# Patient Record
Sex: FEMALE | Race: BLACK OR AFRICAN AMERICAN | NOT HISPANIC OR LATINO | URBAN - METROPOLITAN AREA
[De-identification: names, ages, dates, MRNs, and addresses within clinical notes are randomized per-mention and may not be internally consistent; named-entity substitution may affect disease eponyms.]

---

## 2023-05-08 ENCOUNTER — DOCUMENTATION (OUTPATIENT)
Dept: INTERNAL MEDICINE | Facility: HOSPITAL | Age: 88
End: 2023-05-08
Payer: MEDICARE

## 2023-05-10 ENCOUNTER — NURSING HOME VISIT (OUTPATIENT)
Dept: POST ACUTE CARE | Facility: EXTERNAL LOCATION | Age: 88
End: 2023-05-10
Payer: MEDICARE

## 2023-05-10 DIAGNOSIS — N30.00 ACUTE CYSTITIS WITHOUT HEMATURIA: ICD-10-CM

## 2023-05-10 DIAGNOSIS — F41.9 ANXIETY AND DEPRESSION: ICD-10-CM

## 2023-05-10 DIAGNOSIS — F32.A ANXIETY AND DEPRESSION: ICD-10-CM

## 2023-05-10 DIAGNOSIS — F03.918 DEMENTIA WITH BEHAVIORAL DISTURBANCE (MULTI): ICD-10-CM

## 2023-05-10 DIAGNOSIS — R53.81 PHYSICAL DEBILITY: ICD-10-CM

## 2023-05-10 DIAGNOSIS — N18.31 TYPE 2 DIABETES MELLITUS WITH STAGE 3A CHRONIC KIDNEY DISEASE, WITHOUT LONG-TERM CURRENT USE OF INSULIN (MULTI): ICD-10-CM

## 2023-05-10 DIAGNOSIS — F51.01 PRIMARY INSOMNIA: ICD-10-CM

## 2023-05-10 DIAGNOSIS — I26.99 PULMONARY EMBOLISM AND INFARCTION (MULTI): Primary | ICD-10-CM

## 2023-05-10 DIAGNOSIS — Z91.81 STATUS POST FALL: ICD-10-CM

## 2023-05-10 DIAGNOSIS — E11.22 TYPE 2 DIABETES MELLITUS WITH STAGE 3A CHRONIC KIDNEY DISEASE, WITHOUT LONG-TERM CURRENT USE OF INSULIN (MULTI): ICD-10-CM

## 2023-05-10 PROCEDURE — 99306 1ST NF CARE HIGH MDM 50: CPT | Performed by: FAMILY MEDICINE

## 2023-05-10 NOTE — LETTER
Patient: Renetta Christensen  : 1935    Encounter Date: 05/10/2023    Subjective  Patient ID: Renetta Christensen is a 87 y.o. female who is acute skilled care and presents for initial visit for skilled nursing.    HPI   a 87 year old Female with a past medical history of dementia with behavioral disturbance, anxiety, cataracts, T2DM (last HgA1c 6.1% 23), osteoporosis, CKD stage 3, and pelvic ring fracture. Patient was hospitalized for further evaluation after s/p mechanical fall at home. Patient's daughter impressed that patient became sluggish and fatigued once Buspar dose was increased. Worked up found to have elevated CK level, elevated CRP, impaired renal function, and hypokalemia. CT spine showed negative for any injuries. CT chest showed findings suggestive of occlusive subsegmental PE with pulmonary infarct. Pt was started on Heparin gtt and vascular medicine to switch to prophylaxis with ASA. atient was admitted for further evaluation and management.   For her mechanical fall, PT/OT recommended moderate intensity therapy   For finding of CT suggestive of PE, vascular was consulted, recommended   prophylaxis anticoagulation, no heparin drip, and was placed on Lovenox 30 mg   subcu daily.   Patient presented also with UTI, she was on ceftriaxone 2 g IV piggyback daily,   which was transitioned to Bactrim DS 1 tablet twice daily for additional 5 days   at discharge.  Urine culture showed Klebsiella pneumonia sensitive to most   antibiotics including Bactrim, but resistant to ampicillin.   Hospital stay was also notable for mild renal insufficiency, but did not meet   ROSIBEL criteria with slight uptake of creatinine from 1.09-1.20.   Geriatrics was consulted, and she had a change in regimen, with change of her   Seroquel to 25 mg 3 times daily instead of previous regimen of twice daily,   patient also was on Zyprexa 5 mg IM every 6 hours as needed agitation BuSpar 5   mg 3 times daily was added as well.   Patient has dementia at baseline, and   exhibited episode of agitation and confusion.   Patient's dementia and episode of agitation did not put her in danger to injury   to herself, aPatient discharged to memory care unit/[facility] unit at .         Review of Systems  Limited due to dementia   Objective  Physical Exam  Physical exam  Constitutional: awake, confused and disoriented, afebrile, not in acute distress  Eyes: clear sclera  ENMT: mucous membranes moist  Respiratory/Thorax: Breathing comfortable on RA  Cardiovascular: Regular rate   Musculoskeletal: Move all extremities   Extremities: no edema, no cellulitis  Neurological: A&O x1 (self)  Psychological: Irritability  Skin: warm and dry, intact      Assessment/Plan  Problem List Items Addressed This Visit       Physical debility    Status post fall    Dementia with behavioral disturbance (CMS/HCC)    Anxiety and depression    Insomnia    Urinary tract infection without hematuria    Type 2 diabetes mellitus with stage 3 chronic kidney disease, without long-term current use of insulin (CMS/HCC)    Pulmonary embolism and infarction (CMS/HCC) - Primary       #Physical deconditioning   # Mechanical fall  - No evidence of osseous injury on CT chest, abdomen, pelvis or T and L spine  - Pt has history of falls with prior pelvic ring fx per chart reviewed  - Fall precautions  - Continue PT/OT   # Dementia with behavioral disturbances  #Depression and anxiety   - Per chart reviewed; baseline Pt not oriented to place and time,frequently suffers agitation, paranoia, poor sleep and poses wander risk  - c/w memantine 10 mg oral tablet - 1 tab(s) orally 2 times a day   - continue Buspar 5 mg PO BID   - continue QUEtiapine 25 mg oral tablet - 1 tab(s) orally  - (Every 1  day at 08:30, 12:30 ) and QUEtiapine 50 mg oral tablet - 1 tab(s) orally once (at bedtime)   - c/w OLANZapine 10 mg intramuscular injection - 5 milligram(s) intramuscular every 6 hours, As needed,  agitation   - c/w sertraline 75 mg orally once a day   - Please continue first attempting nonpharmacological management of agitationbefore use of PRN medications.  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   - Recommend discontinuing SSI using lispro  - Please continue hypoglycemic order protocol  - Recommend regular diet.  # HTN  - c/w metoprolol to 50 mg daily   - Will check lab and consider to resume Aldactone 25 mg PO every day  - c/w HCTZ 25 mg PO QD  # CKD stage 3 (stable)  - Cr 1.24 and GFR 43 on admit per chart reviewed   - avoid nephrotoxic medication  - renal dose medication  - monitor weekly BMP   # Concern for PE  - c/w aspirin 81 mg oral delayed release tablet - 1 tab(s) orally daily  # Encounter for medication reviewed   #Goal of care; per patient's chart reviewed goal of care DNR/DNI. However we will need to clarify with patient's daughter     Time spending for 65 minutes   -reviewed of hospital record via EMR and reconciled medication in PCC and EMR (d/c summary). Reviewed orders, medications, records, and pertinent labs/x-rays from PCC. Reviewed VS, BS, O2, etc as per protocol. Reviewed and signed off orders, medications, Labs, x-rays, and current diagnoses in PCC  -Obtained history  -Performing physical examination  -Ordering medications, lab   -Documenting clinical information in the [facility]   -Care coordinating with nursing staff      Electronically Signed By: Phu Nino MD   5/14/23  7:19 PM

## 2023-05-12 ENCOUNTER — NURSING HOME VISIT (OUTPATIENT)
Dept: POST ACUTE CARE | Facility: EXTERNAL LOCATION | Age: 88
End: 2023-05-12
Payer: MEDICARE

## 2023-05-12 DIAGNOSIS — G47.00 INSOMNIA, UNSPECIFIED TYPE: ICD-10-CM

## 2023-05-12 DIAGNOSIS — Z91.81 STATUS POST FALL: ICD-10-CM

## 2023-05-12 DIAGNOSIS — F41.9 ANXIETY AND DEPRESSION: ICD-10-CM

## 2023-05-12 DIAGNOSIS — Z79.899 ENCOUNTER FOR MEDICATION REVIEW: ICD-10-CM

## 2023-05-12 DIAGNOSIS — N18.30 TYPE 2 DIABETES MELLITUS WITH STAGE 3 CHRONIC KIDNEY DISEASE, WITHOUT LONG-TERM CURRENT USE OF INSULIN, UNSPECIFIED WHETHER STAGE 3A OR 3B CKD (MULTI): ICD-10-CM

## 2023-05-12 DIAGNOSIS — R53.81 PHYSICAL DEBILITY: Primary | ICD-10-CM

## 2023-05-12 DIAGNOSIS — E55.9 VITAMIN D DEFICIENCY: ICD-10-CM

## 2023-05-12 DIAGNOSIS — F03.918 DEMENTIA WITH BEHAVIORAL DISTURBANCE (MULTI): ICD-10-CM

## 2023-05-12 DIAGNOSIS — N39.0 URINARY TRACT INFECTION WITHOUT HEMATURIA, SITE UNSPECIFIED: ICD-10-CM

## 2023-05-12 DIAGNOSIS — F32.A ANXIETY AND DEPRESSION: ICD-10-CM

## 2023-05-12 DIAGNOSIS — E11.22 TYPE 2 DIABETES MELLITUS WITH STAGE 3 CHRONIC KIDNEY DISEASE, WITHOUT LONG-TERM CURRENT USE OF INSULIN, UNSPECIFIED WHETHER STAGE 3A OR 3B CKD (MULTI): ICD-10-CM

## 2023-05-12 PROCEDURE — 99310 SBSQ NF CARE HIGH MDM 45: CPT | Performed by: NURSE PRACTITIONER

## 2023-05-12 NOTE — LETTER
"Patient: Renetta Christensen  : 1935    Encounter Date: 2023    Subjective   Patient ID: Renetta Christensen is a 87 y.o. female who is acute skilled care and presents for initial visit for skilled nursing.    HPI   a 87 year old Female with a past medical history of dementia with behavioral disturbance, anxiety, cataracts, T2DM (last HgA1c 6.1% 23), osteoporosis, CKD stage 3, and pelvic ring fracture. Patient was hospitalized for further evaluation after s/p mechanical fall at home. Patient's daughter impressed that patient became sluggish and fatigued once Buspar dose was increased. Worked up found to have elevated CK level, elevated CRP, impaired renal function, and hypokalemia. CT spine showed negative for any injuries. CT chest showed findings suggestive of occlusive subsegmental PE with pulmonary infarct. Pt was started on Heparin gtt and vascular medicine to switch to prophylaxis with ASA. Patient discharged to memory care unit/[facility] unit at .     On encounter; per nursing staff reported patient is confused, agitated and not cooperate with nursing care.  She was wondering around and attempted to leave the unit. She refused this NP to do physical examination. She screamed \"don't touch me, leave me alone. I want to go home.\" While patient was distracted, a nurse was able to give patient a Zyprexa IM to clam her down. However she tolerate PO diet, denies F/C/S/N/V per nursing staff reported. Pt unable to provide any details regarding events prior to fall d/t dementia. Most of patient history obtained from pt's dtr d/t impaired mental status of patient and HIE.   Review of Systems  Limited due to dementia   Objective   2023 14:21 134 / 80 mmHg Sitting l/arm  2023 14:21 97.1 °F Forehead (non-contact)  2023 14:21 70 bpm Not Applicable  2023 14:21 16 Breaths/min  2023 14:21 97.0 % Room Air  Physical Exam  Physical exam  Constitutional: awake, confused and disoriented, " afebrile, not in acute distress  Eyes: clear sclera  ENMT: mucous membranes moist  Respiratory/Thorax: Breathing comfortable on RA  Cardiovascular: Regular rate   Musculoskeletal: Move all extremities   Extremities: no edema, no cellulitis  Neurological: A&O x1 (self)  Psychological: Irritability  Skin: warm and dry, intact    Lab Results   Component Value Date    WBC CANCELED 05/11/2023    HGB CANCELED 05/11/2023    HCT CANCELED 05/11/2023    PLT CANCELED 05/11/2023    ALT 13 01/01/2022    AST 21 01/01/2022    NA CANCELED 05/11/2023    K CANCELED 05/11/2023    CL CANCELED 05/11/2023    CREATININE CANCELED 05/11/2023    BUN CANCELED 05/11/2023    CO2 CANCELED 05/11/2023    TSH 1.21 05/08/2023    INR 1.1 05/04/2023    INR CANCELED 05/04/2023     Assessment/Plan   #Physical deconditioning   # Mechanical fall  - No evidence of osseous injury on CT chest, abdomen, pelvis or T and L spine  - Pt has history of falls with prior pelvic ring fx per chart reviewed  - Fall precautions  - Continue PT/OT   # Dementia with behavioral disturbances  #Depression and anxiety   - Per chart reviewed; baseline Pt not oriented to place and time,frequently suffers agitation, paranoia, poor sleep and poses wander risk  - c/w memantine 10 mg oral tablet - 1 tab(s) orally 2 times a day   - continue Buspar 5 mg PO BID   - continue QUEtiapine 25 mg oral tablet - 1 tab(s) orally  - (Every 1  day at 08:30, 12:30 ) and QUEtiapine 50 mg oral tablet - 1 tab(s) orally once (at bedtime)   - c/w OLANZapine 10 mg intramuscular injection - 5 milligram(s) intramuscular every 6 hours, As needed, agitation   - c/w sertraline 75 mg orally once a day   - Please continue first attempting nonpharmacological management of agitationbefore use of PRN medications.  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   - Recommend discontinuing SSI using lispro  - Please continue hypoglycemic order protocol  - Recommend regular diet.  # HTN  - c/w metoprolol to 50 mg daily   - Will check lab and consider to resume Aldactone 25 mg PO every day  - c/w HCTZ 25 mg PO QD  # CKD stage 3 (stable)  - Cr 1.24 and GFR 43 on admit per chart reviewed   - avoid nephrotoxic medication  - renal dose medication  - monitor weekly BMP   # Concern for PE  - c/w aspirin 81 mg oral delayed release tablet - 1 tab(s) orally daily  # Encounter for medication reviewed   #Goal of care; per patient's chart reviewed goal of care DNR/DNI. However we will need to clarify with patient's daughter     Time spending for 45 minutes   -reviewed of hospital record via EMR and reconciled medication in PCC and EMR (d/c summary). Reviewed orders, medications, records, and pertinent labs/x-rays from PCC. Reviewed VS, BS, O2, etc as per protocol. Reviewed and signed off orders, medications, Labs, x-rays, and current diagnoses in PCC  -Obtained history  -Performing physical examination  -Ordering medications, lab   -Documenting clinical information in the [facility]   -Care coordinating with nursing staff      Electronically Signed By: FELICIA Abarca   5/14/23  5:02 PM

## 2023-05-12 NOTE — PROGRESS NOTES
"Subjective   Patient ID: Renetta Christensen is a 87 y.o. female who is acute skilled care and presents for initial visit for skilled nursing.    HPI   a 87 year old Female with a past medical history of dementia with behavioral disturbance, anxiety, cataracts, T2DM (last HgA1c 6.1% 4/5/23), osteoporosis, CKD stage 3, and pelvic ring fracture. Patient was hospitalized for further evaluation after s/p mechanical fall at home. Patient's daughter impressed that patient became sluggish and fatigued once Buspar dose was increased. Worked up found to have elevated CK level, elevated CRP, impaired renal function, and hypokalemia. CT spine showed negative for any injuries. CT chest showed findings suggestive of occlusive subsegmental PE with pulmonary infarct. Pt was started on Heparin gtt and vascular medicine to switch to prophylaxis with ASA. Patient discharged to memory care unit/[facility] unit at .     On encounter; per nursing staff reported patient is confused, agitated and not cooperate with nursing care.  She was wondering around and attempted to leave the unit. She refused this NP to do physical examination. She screamed \"don't touch me, leave me alone. I want to go home.\" While patient was distracted, a nurse was able to give patient a Zyprexa IM to clam her down. However she tolerate PO diet, denies F/C/S/N/V per nursing staff reported. Pt unable to provide any details regarding events prior to fall d/t dementia. Most of patient history obtained from pt's dtr d/t impaired mental status of patient and HIE.   Review of Systems  Limited due to dementia   Objective   5/12/2023 14:21 134 / 80 mmHg Sitting l/arm  5/12/2023 14:21 97.1 °F Forehead (non-contact)  5/12/2023 14:21 70 bpm Not Applicable  5/12/2023 14:21 16 Breaths/min  5/12/2023 14:21 97.0 % Room Air  Physical Exam  Physical exam  Constitutional: awake, confused and disoriented, afebrile, not in acute distress  Eyes: clear sclera  ENMT: mucous membranes " moist  Respiratory/Thorax: Breathing comfortable on RA  Cardiovascular: Regular rate   Musculoskeletal: Move all extremities   Extremities: no edema, no cellulitis  Neurological: A&O x1 (self)  Psychological: Irritability  Skin: warm and dry, intact    Lab Results   Component Value Date    WBC CANCELED 05/11/2023    HGB CANCELED 05/11/2023    HCT CANCELED 05/11/2023    PLT CANCELED 05/11/2023    ALT 13 01/01/2022    AST 21 01/01/2022    NA CANCELED 05/11/2023    K CANCELED 05/11/2023    CL CANCELED 05/11/2023    CREATININE CANCELED 05/11/2023    BUN CANCELED 05/11/2023    CO2 CANCELED 05/11/2023    TSH 1.21 05/08/2023    INR 1.1 05/04/2023    INR CANCELED 05/04/2023     Assessment/Plan   #Physical deconditioning   # Mechanical fall  - No evidence of osseous injury on CT chest, abdomen, pelvis or T and L spine  - Pt has history of falls with prior pelvic ring fx per chart reviewed  - Fall precautions  - Continue PT/OT   # Dementia with behavioral disturbances  #Depression and anxiety   - Per chart reviewed; baseline Pt not oriented to place and time,frequently suffers agitation, paranoia, poor sleep and poses wander risk  - c/w memantine 10 mg oral tablet - 1 tab(s) orally 2 times a day   - continue Buspar 5 mg PO BID   - continue QUEtiapine 25 mg oral tablet - 1 tab(s) orally  - (Every 1  day at 08:30, 12:30 ) and QUEtiapine 50 mg oral tablet - 1 tab(s) orally once (at bedtime)   - c/w OLANZapine 10 mg intramuscular injection - 5 milligram(s) intramuscular every 6 hours, As needed, agitation   - c/w sertraline 75 mg orally once a day   - Please continue first attempting nonpharmacological management of agitationbefore use of PRN medications.  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   - Recommend discontinuing SSI using lispro  - Please continue hypoglycemic order protocol  - Recommend regular diet.  # HTN  - c/w metoprolol to 50 mg daily   - Will check lab and consider to resume Aldactone 25 mg PO every day  - c/w HCTZ 25 mg PO QD  # CKD stage 3 (stable)  - Cr 1.24 and GFR 43 on admit per chart reviewed   - avoid nephrotoxic medication  - renal dose medication  - monitor weekly BMP   # Concern for PE  - c/w aspirin 81 mg oral delayed release tablet - 1 tab(s) orally daily  # Encounter for medication reviewed   #Goal of care; per patient's chart reviewed goal of care DNR/DNI. However we will need to clarify with patient's daughter     Time spending for 45 minutes   -reviewed of hospital record via EMR and reconciled medication in PCC and EMR (d/c summary). Reviewed orders, medications, records, and pertinent labs/x-rays from PCC. Reviewed VS, BS, O2, etc as per protocol. Reviewed and signed off orders, medications, Labs, x-rays, and current diagnoses in PCC  -Obtained history  -Performing physical examination  -Ordering medications, lab   -Documenting clinical information in the [facility]   -Care coordinating with nursing staff

## 2023-05-14 ENCOUNTER — NURSING HOME VISIT (OUTPATIENT)
Dept: POST ACUTE CARE | Facility: EXTERNAL LOCATION | Age: 88
End: 2023-05-14
Payer: MEDICARE

## 2023-05-14 DIAGNOSIS — F03.918 DEMENTIA WITH BEHAVIORAL DISTURBANCE (MULTI): Primary | ICD-10-CM

## 2023-05-14 DIAGNOSIS — I26.99 PULMONARY EMBOLISM AND INFARCTION (MULTI): ICD-10-CM

## 2023-05-14 DIAGNOSIS — F41.9 ANXIETY AND DEPRESSION: ICD-10-CM

## 2023-05-14 DIAGNOSIS — Z91.81 STATUS POST FALL: ICD-10-CM

## 2023-05-14 DIAGNOSIS — R53.81 PHYSICAL DEBILITY: ICD-10-CM

## 2023-05-14 DIAGNOSIS — Z79.899 ENCOUNTER FOR MEDICATION REVIEW: ICD-10-CM

## 2023-05-14 DIAGNOSIS — E11.22 TYPE 2 DIABETES MELLITUS WITH STAGE 3A CHRONIC KIDNEY DISEASE, WITHOUT LONG-TERM CURRENT USE OF INSULIN (MULTI): ICD-10-CM

## 2023-05-14 DIAGNOSIS — F51.05 INSOMNIA DUE TO OTHER MENTAL DISORDER: ICD-10-CM

## 2023-05-14 DIAGNOSIS — N18.31 TYPE 2 DIABETES MELLITUS WITH STAGE 3A CHRONIC KIDNEY DISEASE, WITHOUT LONG-TERM CURRENT USE OF INSULIN (MULTI): ICD-10-CM

## 2023-05-14 DIAGNOSIS — N30.00 ACUTE CYSTITIS WITHOUT HEMATURIA: ICD-10-CM

## 2023-05-14 DIAGNOSIS — F99 INSOMNIA DUE TO OTHER MENTAL DISORDER: ICD-10-CM

## 2023-05-14 DIAGNOSIS — F32.A ANXIETY AND DEPRESSION: ICD-10-CM

## 2023-05-14 PROBLEM — G47.00 INSOMNIA: Status: ACTIVE | Noted: 2023-05-14

## 2023-05-14 PROBLEM — N18.30 TYPE 2 DIABETES MELLITUS WITH STAGE 3 CHRONIC KIDNEY DISEASE, WITHOUT LONG-TERM CURRENT USE OF INSULIN (MULTI): Status: ACTIVE | Noted: 2023-05-14

## 2023-05-14 PROBLEM — N39.0 URINARY TRACT INFECTION WITHOUT HEMATURIA: Status: ACTIVE | Noted: 2023-05-14

## 2023-05-14 PROCEDURE — 99309 SBSQ NF CARE MODERATE MDM 30: CPT | Performed by: FAMILY MEDICINE

## 2023-05-14 NOTE — PROGRESS NOTES
Subjective   Patient ID: Renetta Christensen is a 87 y.o. female who is acute skilled care and presents for initial visit for skilled nursing.    HPI   a 87 year old Female with a past medical history of dementia with behavioral disturbance, anxiety, cataracts, T2DM (last HgA1c 6.1% 4/5/23), osteoporosis, CKD stage 3, and pelvic ring fracture. Patient was hospitalized for further evaluation after s/p mechanical fall at home. Patient's daughter impressed that patient became sluggish and fatigued once Buspar dose was increased. Worked up found to have elevated CK level, elevated CRP, impaired renal function, and hypokalemia. CT spine showed negative for any injuries. CT chest showed findings suggestive of occlusive subsegmental PE with pulmonary infarct. Pt was started on Heparin gtt and vascular medicine to switch to prophylaxis with ASA. atient was admitted for further evaluation and management.   For her mechanical fall, PT/OT recommended moderate intensity therapy   For finding of CT suggestive of PE, vascular was consulted, recommended   prophylaxis anticoagulation, no heparin drip, and was placed on Lovenox 30 mg   subcu daily.   Patient presented also with UTI, she was on ceftriaxone 2 g IV piggyback daily,   which was transitioned to Bactrim DS 1 tablet twice daily for additional 5 days   at discharge.  Urine culture showed Klebsiella pneumonia sensitive to most   antibiotics including Bactrim, but resistant to ampicillin.   Hospital stay was also notable for mild renal insufficiency, but did not meet   ROSIBEL criteria with slight uptake of creatinine from 1.09-1.20.   Geriatrics was consulted, and she had a change in regimen, with change of her   Seroquel to 25 mg 3 times daily instead of previous regimen of twice daily,   patient also was on Zyprexa 5 mg IM every 6 hours as needed agitation BuSpar 5   mg 3 times daily was added as well.  Patient has dementia at baseline, and   exhibited episode of agitation and  confusion.   Patient's dementia and episode of agitation did not put her in danger to injury   to herself, aPatient discharged to memory care unit/[facility] unit at .         Review of Systems  Limited due to dementia   Objective   Physical Exam  Physical exam  Constitutional: awake, confused and disoriented, afebrile, not in acute distress  Eyes: clear sclera  ENMT: mucous membranes moist  Respiratory/Thorax: Breathing comfortable on RA  Cardiovascular: Regular rate   Musculoskeletal: Move all extremities   Extremities: no edema, no cellulitis  Neurological: A&O x1 (self)  Psychological: Irritability  Skin: warm and dry, intact      Assessment/Plan   Problem List Items Addressed This Visit       Physical debility    Status post fall    Dementia with behavioral disturbance (CMS/HCC)    Anxiety and depression    Insomnia    Urinary tract infection without hematuria    Type 2 diabetes mellitus with stage 3 chronic kidney disease, without long-term current use of insulin (CMS/HCC)    Pulmonary embolism and infarction (CMS/HCC) - Primary       #Physical deconditioning   # Mechanical fall  - No evidence of osseous injury on CT chest, abdomen, pelvis or T and L spine  - Pt has history of falls with prior pelvic ring fx per chart reviewed  - Fall precautions  - Continue PT/OT   # Dementia with behavioral disturbances  #Depression and anxiety   - Per chart reviewed; baseline Pt not oriented to place and time,frequently suffers agitation, paranoia, poor sleep and poses wander risk  - c/w memantine 10 mg oral tablet - 1 tab(s) orally 2 times a day   - continue Buspar 5 mg PO BID   - continue QUEtiapine 25 mg oral tablet - 1 tab(s) orally  - (Every 1  day at 08:30, 12:30 ) and QUEtiapine 50 mg oral tablet - 1 tab(s) orally once (at bedtime)   - c/w OLANZapine 10 mg intramuscular injection - 5 milligram(s) intramuscular every 6 hours, As needed, agitation   - c/w sertraline 75 mg orally once a day   - Please continue first  attempting nonpharmacological management of agitationbefore use of PRN medications.  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   - Recommend discontinuing SSI using lispro  - Please continue hypoglycemic order protocol  - Recommend regular diet.  # HTN  - c/w metoprolol to 50 mg daily   - Will check lab and consider to resume Aldactone 25 mg PO every day  - c/w HCTZ 25 mg PO QD  # CKD stage 3 (stable)  - Cr 1.24 and GFR 43 on admit per chart reviewed   - avoid nephrotoxic medication  - renal dose medication  - monitor weekly BMP   # Concern for PE  - c/w aspirin 81 mg oral delayed release tablet - 1 tab(s) orally daily  # Encounter for medication reviewed   #Goal of care; per patient's chart reviewed goal of care DNR/DNI. However we will need to clarify with patient's daughter     Time spending for 65 minutes   -reviewed of hospital record via EMR and reconciled medication in PCC and EMR (d/c summary). Reviewed orders, medications, records, and pertinent labs/x-rays from PCC. Reviewed VS, BS, O2, etc as per protocol. Reviewed and signed off orders, medications, Labs, x-rays, and current diagnoses in PCC  -Obtained history  -Performing physical examination  -Ordering medications, lab   -Documenting clinical information in the [facility]   -Care coordinating with nursing staff

## 2023-05-17 ENCOUNTER — NURSING HOME VISIT (OUTPATIENT)
Dept: POST ACUTE CARE | Facility: EXTERNAL LOCATION | Age: 88
End: 2023-05-17
Payer: MEDICARE

## 2023-05-17 DIAGNOSIS — E11.22 TYPE 2 DIABETES MELLITUS WITH STAGE 3A CHRONIC KIDNEY DISEASE, WITHOUT LONG-TERM CURRENT USE OF INSULIN (MULTI): ICD-10-CM

## 2023-05-17 DIAGNOSIS — F03.918 DEMENTIA WITH BEHAVIORAL DISTURBANCE (MULTI): Primary | ICD-10-CM

## 2023-05-17 DIAGNOSIS — F99 INSOMNIA DUE TO OTHER MENTAL DISORDER: ICD-10-CM

## 2023-05-17 DIAGNOSIS — N18.31 TYPE 2 DIABETES MELLITUS WITH STAGE 3A CHRONIC KIDNEY DISEASE, WITHOUT LONG-TERM CURRENT USE OF INSULIN (MULTI): ICD-10-CM

## 2023-05-17 DIAGNOSIS — R53.81 PHYSICAL DEBILITY: ICD-10-CM

## 2023-05-17 DIAGNOSIS — F41.9 ANXIETY AND DEPRESSION: ICD-10-CM

## 2023-05-17 DIAGNOSIS — I26.99 PULMONARY EMBOLISM AND INFARCTION (MULTI): ICD-10-CM

## 2023-05-17 DIAGNOSIS — Z79.899 ENCOUNTER FOR MEDICATION REVIEW: ICD-10-CM

## 2023-05-17 DIAGNOSIS — N30.00 ACUTE CYSTITIS WITHOUT HEMATURIA: ICD-10-CM

## 2023-05-17 DIAGNOSIS — F51.05 INSOMNIA DUE TO OTHER MENTAL DISORDER: ICD-10-CM

## 2023-05-17 DIAGNOSIS — Z91.81 STATUS POST FALL: ICD-10-CM

## 2023-05-17 DIAGNOSIS — F32.A ANXIETY AND DEPRESSION: ICD-10-CM

## 2023-05-17 PROCEDURE — 99309 SBSQ NF CARE MODERATE MDM 30: CPT | Performed by: FAMILY MEDICINE

## 2023-05-17 NOTE — LETTER
Patient: Renetta Christensen  : 1935    Encounter Date: 2023    Subjective  Patient ID: Renetta Christensen is a 87 y.o. female who is acute skilled care and presents for follow up visit for skilled nursing.    HPI   a 87 year old Female with a past medical history of dementia with behavioral disturbance, anxiety, cataracts, T2DM (last HgA1c 6.1% 23), osteoporosis, CKD stage 3, and pelvic ring fracture. Patient was hospitalized for further evaluation after s/p mechanical fall at home. Patient's daughter impressed that patient became sluggish and fatigued once Buspar dose was increased. Worked up found to have elevated CK level, elevated CRP, impaired renal function, and hypokalemia. CT spine showed negative for any injuries. CT chest showed findings suggestive of occlusive subsegmental PE with pulmonary infarct. Pt was started on Heparin gtt and vascular medicine to switch to prophylaxis with ASA. atient was admitted for further evaluation and management.   For her mechanical fall, PT/OT recommended moderate intensity therapy   For finding of CT suggestive of PE, vascular was consulted, recommended   prophylaxis anticoagulation, no heparin drip, and was placed on Lovenox 30 mg   subcu daily.   Patient presented also with UTI, she was on ceftriaxone 2 g IV piggyback daily,   which was transitioned to Bactrim DS 1 tablet twice daily for additional 5 days   at discharge.  Urine culture showed Klebsiella pneumonia sensitive to most   antibiotics including Bactrim, but resistant to ampicillin.   Hospital stay was also notable for mild renal insufficiency, but did not meet   ROSIBEL criteria with slight uptake of creatinine from 1.09-1.20.   Geriatrics was consulted, and she had a change in regimen, with change of her   Seroquel to 25 mg 3 times daily instead of previous regimen of twice daily,   patient also was on Zyprexa 5 mg IM every 6 hours as needed agitation BuSpar 5   mg 3 times daily was added as well.   Patient has dementia at baseline, and   exhibited episode of agitation and confusion.   Patient's dementia and episode of agitation did not put her in danger to injury   to herself, aPatient discharged to memory care unit/[facility] unit at .     Continues to have agitation. Risk to herself and staff    Review of Systems  Limited due to dementia   Objective  Physical Exam  Physical exam  Constitutional: awake, confused and disoriented, afebrile, not in acute distress  Eyes: clear sclera  ENMT: mucous membranes moist  Respiratory/Thorax: Breathing comfortable on RA  Cardiovascular: Regular rate   Musculoskeletal: Move all extremities   Extremities: no edema, no cellulitis  Neurological: A&O x1 (self)  Psychological: Irritability  Skin: warm and dry, intact      Assessment/Plan  Problem List Items Addressed This Visit    None    #Physical deconditioning   # Mechanical fall  - No evidence of osseous injury on CT chest, abdomen, pelvis or T and L spine  - Pt has history of falls with prior pelvic ring fx per chart reviewed  - Fall precautions  - Continue PT/OT   # Dementia with behavioral disturbances  #Depression and anxiety   - Per chart reviewed; baseline Pt not oriented to place and time,frequently suffers agitation, paranoia, poor sleep and poses wander risk  - c/w memantine 10 mg oral tablet - 1 tab(s) orally 2 times a day   - continue Buspar 5 mg PO BID   - continue QUEtiapine 25 mg oral tablet - 1 tab(s) orally  - (Every 1  day at 08:30, 12:30 ) and QUEtiapine 50 mg oral tablet - 1 tab(s) orally once (at bedtime)   - c/w OLANZapine 10 mg intramuscular injection - 5 milligram(s) intramuscular every 6 hours, As needed, agitation   - c/w sertraline 75 mg orally once a day   - Please continue first attempting nonpharmacological management of agitationbefore use of PRN medications.  - consult psych   #Insomnia  - C/W melatonin   # Vit D insufficiency (level: 21)  -   Recommend an outpatient DEXA scan once stable  -  Continue D3 supplementation 2000 IUs daily  # UTI  - Urine Cx 5/7 growing Klebsiella (Bactrim sensitive)  - C/W Bactrim PO  # T2DM (last HgA1c 6.1% 4/5/23)  - Per chart reviewed; discontinue metformin due to A1c showing well-controlled diabetes and concern for hypoglycemia posing increased risk to geriatric population.   - Recommend discontinuing SSI using lispro  - Please continue hypoglycemic order protocol  - Recommend regular diet.  # HTN  - c/w metoprolol to 50 mg daily   - Will check lab and consider to resume Aldactone 25 mg PO every day  - c/w HCTZ 25 mg PO QD  # CKD stage 3 (stable)  - Cr 1.24 and GFR 43 on admit per chart reviewed   - avoid nephrotoxic medication  - renal dose medication  - monitor weekly BMP   # Concern for PE  - c/w aspirin 81 mg oral delayed release tablet - 1 tab(s) orally daily  # Encounter for medication reviewed   #Goal of care; per patient's chart reviewed goal of care DNR/DNI. However we will need to clarify with patient's daughter     Time spending for 35 minutes       Electronically Signed By: Phu Nino MD   5/28/23  5:37 PM

## 2023-05-28 NOTE — PROGRESS NOTES
Subjective   Patient ID: Renetta Christensen is a 87 y.o. female who is acute skilled care and presents for follow up visit for skilled nursing.    HPI   a 87 year old Female with a past medical history of dementia with behavioral disturbance, anxiety, cataracts, T2DM (last HgA1c 6.1% 4/5/23), osteoporosis, CKD stage 3, and pelvic ring fracture. Patient was hospitalized for further evaluation after s/p mechanical fall at home. Patient's daughter impressed that patient became sluggish and fatigued once Buspar dose was increased. Worked up found to have elevated CK level, elevated CRP, impaired renal function, and hypokalemia. CT spine showed negative for any injuries. CT chest showed findings suggestive of occlusive subsegmental PE with pulmonary infarct. Pt was started on Heparin gtt and vascular medicine to switch to prophylaxis with ASA. atient was admitted for further evaluation and management.   For her mechanical fall, PT/OT recommended moderate intensity therapy   For finding of CT suggestive of PE, vascular was consulted, recommended   prophylaxis anticoagulation, no heparin drip, and was placed on Lovenox 30 mg   subcu daily.   Patient presented also with UTI, she was on ceftriaxone 2 g IV piggyback daily,   which was transitioned to Bactrim DS 1 tablet twice daily for additional 5 days   at discharge.  Urine culture showed Klebsiella pneumonia sensitive to most   antibiotics including Bactrim, but resistant to ampicillin.   Hospital stay was also notable for mild renal insufficiency, but did not meet   ROSIBEL criteria with slight uptake of creatinine from 1.09-1.20.   Geriatrics was consulted, and she had a change in regimen, with change of her   Seroquel to 25 mg 3 times daily instead of previous regimen of twice daily,   patient also was on Zyprexa 5 mg IM every 6 hours as needed agitation BuSpar 5   mg 3 times daily was added as well.  Patient has dementia at baseline, and   exhibited episode of agitation and  confusion.   Patient's dementia and episode of agitation did not put her in danger to injury   to herself, aPatient discharged to memory care unit/[facility] unit at .     Continues to have agitation. Risk to herself and staff    Review of Systems  Limited due to dementia   Objective   Physical Exam  Physical exam  Constitutional: awake, confused and disoriented, afebrile, not in acute distress  Eyes: clear sclera  ENMT: mucous membranes moist  Respiratory/Thorax: Breathing comfortable on RA  Cardiovascular: Regular rate   Musculoskeletal: Move all extremities   Extremities: no edema, no cellulitis  Neurological: A&O x1 (self)  Psychological: Irritability  Skin: warm and dry, intact      Assessment/Plan   Problem List Items Addressed This Visit    None    #Physical deconditioning   # Mechanical fall  - No evidence of osseous injury on CT chest, abdomen, pelvis or T and L spine  - Pt has history of falls with prior pelvic ring fx per chart reviewed  - Fall precautions  - Continue PT/OT   # Dementia with behavioral disturbances  #Depression and anxiety   - Per chart reviewed; baseline Pt not oriented to place and time,frequently suffers agitation, paranoia, poor sleep and poses wander risk  - c/w memantine 10 mg oral tablet - 1 tab(s) orally 2 times a day   - continue Buspar 5 mg PO BID   - continue QUEtiapine 25 mg oral tablet - 1 tab(s) orally  - (Every 1  day at 08:30, 12:30 ) and QUEtiapine 50 mg oral tablet - 1 tab(s) orally once (at bedtime)   - c/w OLANZapine 10 mg intramuscular injection - 5 milligram(s) intramuscular every 6 hours, As needed, agitation   - c/w sertraline 75 mg orally once a day   - Please continue first attempting nonpharmacological management of agitationbefore use of PRN medications.  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   - Recommend discontinuing SSI using lispro  - Please continue hypoglycemic order protocol  - Recommend regular diet.  # HTN  - c/w metoprolol to 50 mg daily   - Will check lab and consider to resume Aldactone 25 mg PO every day  - c/w HCTZ 25 mg PO QD  # CKD stage 3 (stable)  - Cr 1.24 and GFR 43 on admit per chart reviewed   - avoid nephrotoxic medication  - renal dose medication  - monitor weekly BMP   # Concern for PE  - c/w aspirin 81 mg oral delayed release tablet - 1 tab(s) orally daily  # Encounter for medication reviewed   #Goal of care; per patient's chart reviewed goal of care DNR/DNI. However we will need to clarify with patient's daughter     Time spending for 35 minutes

## 2023-05-28 NOTE — PROGRESS NOTES
Subjective   Patient ID: Renetta Christensen is a 87 y.o. female who is acute skilled care and presents for follow up visit for skilled nursing.    HPI   a 87 year old Female with a past medical history of dementia with behavioral disturbance, anxiety, cataracts, T2DM (last HgA1c 6.1% 4/5/23), osteoporosis, CKD stage 3, and pelvic ring fracture. Patient was hospitalized for further evaluation after s/p mechanical fall at home. Patient's daughter impressed that patient became sluggish and fatigued once Buspar dose was increased. Worked up found to have elevated CK level, elevated CRP, impaired renal function, and hypokalemia. CT spine showed negative for any injuries. CT chest showed findings suggestive of occlusive subsegmental PE with pulmonary infarct. Pt was started on Heparin gtt and vascular medicine to switch to prophylaxis with ASA. atient was admitted for further evaluation and management.   For her mechanical fall, PT/OT recommended moderate intensity therapy   For finding of CT suggestive of PE, vascular was consulted, recommended   prophylaxis anticoagulation, no heparin drip, and was placed on Lovenox 30 mg   subcu daily.   Patient presented also with UTI, she was on ceftriaxone 2 g IV piggyback daily,   which was transitioned to Bactrim DS 1 tablet twice daily for additional 5 days   at discharge.  Urine culture showed Klebsiella pneumonia sensitive to most   antibiotics including Bactrim, but resistant to ampicillin.   Hospital stay was also notable for mild renal insufficiency, but did not meet   RSOIBEL criteria with slight uptake of creatinine from 1.09-1.20.   Geriatrics was consulted, and she had a change in regimen, with change of her   Seroquel to 25 mg 3 times daily instead of previous regimen of twice daily,   patient also was on Zyprexa 5 mg IM every 6 hours as needed agitation BuSpar 5   mg 3 times daily was added as well.  Patient has dementia at baseline, and   exhibited episode of agitation and  confusion.   Patient's dementia and episode of agitation did not put her in danger to injury   to herself, aPatient discharged to memory care unit/[facility] unit at .     Continues to have agitation. Risk to herself and staff    Review of Systems  Limited due to dementia   Objective   Physical Exam  Physical exam  Constitutional: awake, confused and disoriented, afebrile, not in acute distress  Eyes: clear sclera  ENMT: mucous membranes moist  Respiratory/Thorax: Breathing comfortable on RA  Cardiovascular: Regular rate   Musculoskeletal: Move all extremities   Extremities: no edema, no cellulitis  Neurological: A&O x1 (self)  Psychological: Irritability  Skin: warm and dry, intact      Assessment/Plan   Problem List Items Addressed This Visit    None    #Physical deconditioning   # Mechanical fall  - No evidence of osseous injury on CT chest, abdomen, pelvis or T and L spine  - Pt has history of falls with prior pelvic ring fx per chart reviewed  - Fall precautions  - Continue PT/OT   # Dementia with behavioral disturbances  #Depression and anxiety   - Per chart reviewed; baseline Pt not oriented to place and time,frequently suffers agitation, paranoia, poor sleep and poses wander risk  - c/w memantine 10 mg oral tablet - 1 tab(s) orally 2 times a day   - continue Buspar 5 mg PO BID   - continue QUEtiapine 25 mg oral tablet - 1 tab(s) orally  - (Every 1  day at 08:30, 12:30 ) and QUEtiapine 50 mg oral tablet - 1 tab(s) orally once (at bedtime)   - c/w OLANZapine 10 mg intramuscular injection - 5 milligram(s) intramuscular every 6 hours, As needed, agitation   - c/w sertraline 75 mg orally once a day   - Please continue first attempting nonpharmacological management of agitationbefore use of PRN medications.  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   - Recommend discontinuing SSI using lispro  - Please continue hypoglycemic order protocol  - Recommend regular diet.  # HTN  - c/w metoprolol to 50 mg daily   - Will check lab and consider to resume Aldactone 25 mg PO every day  - c/w HCTZ 25 mg PO QD  # CKD stage 3 (stable)  - Cr 1.24 and GFR 43 on admit per chart reviewed   - avoid nephrotoxic medication  - renal dose medication  - monitor weekly BMP   # Concern for PE  - c/w aspirin 81 mg oral delayed release tablet - 1 tab(s) orally daily  # Encounter for medication reviewed   #Goal of care; per patient's chart reviewed goal of care DNR/DNI. However we will need to clarify with patient's daughter     Time spending for 35 minutes